# Patient Record
Sex: MALE | Race: WHITE | NOT HISPANIC OR LATINO | URBAN - METROPOLITAN AREA
[De-identification: names, ages, dates, MRNs, and addresses within clinical notes are randomized per-mention and may not be internally consistent; named-entity substitution may affect disease eponyms.]

---

## 2017-06-17 ENCOUNTER — EMERGENCY (EMERGENCY)
Facility: HOSPITAL | Age: 47
LOS: 0 days | Discharge: HOME | End: 2017-06-17
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
## 2017-06-28 DIAGNOSIS — S86.912A STRAIN OF UNSPECIFIED MUSCLE(S) AND TENDON(S) AT LOWER LEG LEVEL, LEFT LEG, INITIAL ENCOUNTER: ICD-10-CM
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
## 2017-06-28 DIAGNOSIS — S89.92XA UNSPECIFIED INJURY OF LEFT LOWER LEG, INITIAL ENCOUNTER: ICD-10-CM
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
## 2017-06-28 DIAGNOSIS — Z98.890 OTHER SPECIFIED POSTPROCEDURAL STATES: ICD-10-CM

## 2017-09-28 ENCOUNTER — OUTPATIENT (OUTPATIENT)
Dept: OUTPATIENT SERVICES | Facility: HOSPITAL | Age: 47
LOS: 1 days | Discharge: HOME | End: 2017-09-28

## 2017-09-28 DIAGNOSIS — Z01.818 ENCOUNTER FOR OTHER PREPROCEDURAL EXAMINATION: ICD-10-CM

## 2017-10-12 ENCOUNTER — OUTPATIENT (OUTPATIENT)
Dept: OUTPATIENT SERVICES | Facility: HOSPITAL | Age: 47
LOS: 1 days | Discharge: HOME | End: 2017-10-12

## 2017-10-17 DIAGNOSIS — M23.232 DERANGEMENT OF OTHER MEDIAL MENISCUS DUE TO OLD TEAR OR INJURY, LEFT KNEE: ICD-10-CM

## 2017-10-17 DIAGNOSIS — M94.262 CHONDROMALACIA, LEFT KNEE: ICD-10-CM

## 2018-01-17 ENCOUNTER — OUTPATIENT (OUTPATIENT)
Dept: OUTPATIENT SERVICES | Facility: HOSPITAL | Age: 48
LOS: 1 days | Discharge: HOME | End: 2018-01-17

## 2018-01-17 DIAGNOSIS — Z01.818 ENCOUNTER FOR OTHER PREPROCEDURAL EXAMINATION: ICD-10-CM

## 2018-01-25 ENCOUNTER — OUTPATIENT (OUTPATIENT)
Dept: OUTPATIENT SERVICES | Facility: HOSPITAL | Age: 48
LOS: 1 days | Discharge: HOME | End: 2018-01-25

## 2018-01-29 DIAGNOSIS — M94.212 CHONDROMALACIA, LEFT SHOULDER: ICD-10-CM

## 2018-01-29 DIAGNOSIS — M75.42 IMPINGEMENT SYNDROME OF LEFT SHOULDER: ICD-10-CM

## 2018-01-29 DIAGNOSIS — M75.122 COMPLETE ROTATOR CUFF TEAR OR RUPTURE OF LEFT SHOULDER, NOT SPECIFIED AS TRAUMATIC: ICD-10-CM

## 2018-06-11 PROBLEM — Z00.00 ENCOUNTER FOR PREVENTIVE HEALTH EXAMINATION: Status: ACTIVE | Noted: 2018-06-11

## 2018-07-05 ENCOUNTER — APPOINTMENT (OUTPATIENT)
Dept: PLASTIC SURGERY | Facility: CLINIC | Age: 48
End: 2018-07-05
Payer: MEDICAID

## 2018-07-05 VITALS — BODY MASS INDEX: 30.06 KG/M2 | HEIGHT: 70 IN | WEIGHT: 210 LBS

## 2018-07-05 DIAGNOSIS — Z78.9 OTHER SPECIFIED HEALTH STATUS: ICD-10-CM

## 2018-07-05 DIAGNOSIS — L72.9 FOLLICULAR CYST OF THE SKIN AND SUBCUTANEOUS TISSUE, UNSPECIFIED: ICD-10-CM

## 2018-07-05 PROCEDURE — 99203 OFFICE O/P NEW LOW 30 MIN: CPT

## 2022-07-20 ENCOUNTER — LABORATORY RESULT (OUTPATIENT)
Age: 52
End: 2022-07-20

## 2022-07-20 ENCOUNTER — APPOINTMENT (OUTPATIENT)
Dept: RHEUMATOLOGY | Facility: CLINIC | Age: 52
End: 2022-07-20

## 2022-07-20 ENCOUNTER — TRANSCRIPTION ENCOUNTER (OUTPATIENT)
Age: 52
End: 2022-07-20

## 2022-07-20 VITALS
WEIGHT: 210 LBS | HEIGHT: 70 IN | DIASTOLIC BLOOD PRESSURE: 84 MMHG | BODY MASS INDEX: 30.06 KG/M2 | HEART RATE: 92 BPM | SYSTOLIC BLOOD PRESSURE: 144 MMHG | OXYGEN SATURATION: 98 %

## 2022-07-20 LAB
ALBUMIN SERPL ELPH-MCNC: 4.5 G/DL
ALP BLD-CCNC: 38 U/L
ALT SERPL-CCNC: 39 U/L
ANION GAP SERPL CALC-SCNC: 13 MMOL/L
AST SERPL-CCNC: 33 U/L
BASOPHILS # BLD AUTO: 0.03 K/UL
BASOPHILS NFR BLD AUTO: 0.5 %
BILIRUB SERPL-MCNC: 0.5 MG/DL
BUN SERPL-MCNC: 10 MG/DL
CALCIUM SERPL-MCNC: 9.4 MG/DL
CHLORIDE SERPL-SCNC: 103 MMOL/L
CK SERPL-CCNC: 738 U/L
CO2 SERPL-SCNC: 25 MMOL/L
CREAT SERPL-MCNC: 1.4 MG/DL
EGFR: 60 ML/MIN/1.73M2
EOSINOPHIL # BLD AUTO: 0.04 K/UL
EOSINOPHIL NFR BLD AUTO: 0.7 %
ERYTHROCYTE [SEDIMENTATION RATE] IN BLOOD BY WESTERGREN METHOD: 5 MM/HR
GLUCOSE SERPL-MCNC: 61 MG/DL
HCT VFR BLD CALC: 49.3 %
HGB BLD-MCNC: 16.8 G/DL
IMM GRANULOCYTES NFR BLD AUTO: 0.5 %
LYMPHOCYTES # BLD AUTO: 1.39 K/UL
LYMPHOCYTES NFR BLD AUTO: 22.8 %
MAN DIFF?: NORMAL
MCHC RBC-ENTMCNC: 32.1 PG
MCHC RBC-ENTMCNC: 34.1 G/DL
MCV RBC AUTO: 94.1 FL
MONOCYTES # BLD AUTO: 0.64 K/UL
MONOCYTES NFR BLD AUTO: 10.5 %
NEUTROPHILS # BLD AUTO: 3.96 K/UL
NEUTROPHILS NFR BLD AUTO: 65 %
PLATELET # BLD AUTO: 181 K/UL
POTASSIUM SERPL-SCNC: 3.9 MMOL/L
PROT SERPL-MCNC: 6.6 G/DL
RBC # BLD: 5.24 M/UL
RBC # FLD: 12.3 %
SODIUM SERPL-SCNC: 141 MMOL/L
WBC # FLD AUTO: 6.09 K/UL

## 2022-07-20 PROCEDURE — 99205 OFFICE O/P NEW HI 60 MIN: CPT

## 2022-07-20 NOTE — ASSESSMENT
[FreeTextEntry1] : Arthralgias in setting of psoriasis\par -His history suggests inflammatory arthritis of joint and possible sacroiliitis with morning stiffness of 1 hour and constantly having to move his joints to improve his symptoms. He has no joint swelling or synovitis on exam, and his lumbar flexion is normal. He also has intermittent episodes of red, itchy, dry and sometimes painful eyes. Its unclear if all of his symptoms are related to underlying psoriasis. Will check x-rays hands, wrists, elbows, knees, sacroiliac joint. Check RF, CCP, ESR, CRP, HLA B27, hepatitis B and C serologies, TB quantiferon, SSA, SSB. Start diclofenac 75 mg BID PRN. Follow up in 2 weeks to go over results

## 2022-07-20 NOTE — REASON FOR VISIT
[Initial Evaluation] : an initial evaluation [Spouse] : spouse [FreeTextEntry1] : joint pain in setting of psoriasis

## 2022-07-20 NOTE — HISTORY OF PRESENT ILLNESS
[FreeTextEntry1] : He reports being diagnosed with psoriasis 1 month ago involving his fingernails and cuticles. He follows with [facility]. He was treated with injections in his cuticles and fingernails, and clobetasol cream. Both treatments have helped. His left thumb subungual biopsy suggested hyperkeratosis. He reports joint pains for the last 1 year involving his fingers, wrists, elbows, and knees associated with 30 minutes - 1 hour of AM stiffness helped with movement, how shower, and Advil. Symptoms worsen when he stays still. Reports swelling of his fingers as well. He reports low back pain for over 1 year with 1+ hour of AM  stiffness, better when he moves around and walks, and advil, worse at rest and staying still. Reports heel pain. He has episodes of eye redness, dry eyes, itchiness that can become painful. He uses artificial tears that helps somewhat. Reports abdominal pains for the past few months having bowel movements 1x/daily used to have up to 4-5x/daily. Reports mouth ulcers, dry mouth, fatigue, night sweats. Denies dactylitis, hematochezia.

## 2022-07-20 NOTE — PHYSICAL EXAM
[General Appearance - Alert] : alert [General Appearance - In No Acute Distress] : in no acute distress [Sclera] : the sclera and conjunctiva were normal [PERRL With Normal Accommodation] : pupils were equal in size, round, and reactive to light [Extraocular Movements] : extraocular movements were intact [Outer Ear] : the ears and nose were normal in appearance [Oropharynx] : the oropharynx was normal [Neck Appearance] : the appearance of the neck was normal [Neck Cervical Mass (___cm)] : no neck mass was observed [Jugular Venous Distention Increased] : there was no jugular-venous distention [Thyroid Diffuse Enlargement] : the thyroid was not enlarged [Thyroid Nodule] : there were no palpable thyroid nodules [Auscultation Breath Sounds / Voice Sounds] : lungs were clear to auscultation bilaterally [Heart Rate And Rhythm] : heart rate was normal and rhythm regular [Heart Sounds] : normal S1 and S2 [Heart Sounds Gallop] : no gallops [Murmurs] : no murmurs [Heart Sounds Pericardial Friction Rub] : no pericardial rub [Bowel Sounds] : normal bowel sounds [Abdomen Soft] : soft [Abdomen Tenderness] : non-tender [] : no hepato-splenomegaly [Abdomen Mass (___ Cm)] : no abdominal mass palpated [Abnormal Walk] : normal gait [Nail Clubbing] : no clubbing  or cyanosis of the fingernails [Musculoskeletal - Swelling] : no joint swelling seen [Motor Tone] : muscle strength and tone were normal [Affect] : the affect was normal [Mood] : the mood was normal [FreeTextEntry1] : Dry skin bilateral knees. Finger nails with hyperpigmentation subungual, areas of onychomycosis

## 2022-07-21 LAB
25(OH)D3 SERPL-MCNC: 37 NG/ML
CRP SERPL-MCNC: <3 MG/L
HBV CORE IGG+IGM SER QL: NONREACTIVE
HBV CORE IGM SER QL: NONREACTIVE
HBV SURFACE AB SER QL: NONREACTIVE
HBV SURFACE AG SER QL: NONREACTIVE
HCV AB SER QL: NONREACTIVE
HCV S/CO RATIO: 0.06 S/CO
RHEUMATOID FACT SER QL: <10 IU/ML

## 2022-07-22 LAB
CCP AB SER IA-ACNC: <8 UNITS
ENA SS-A AB SER IA-ACNC: <0.2 AL
ENA SS-B AB SER IA-ACNC: <0.2 AL
RF+CCP IGG SER-IMP: NEGATIVE

## 2022-07-25 LAB
ALDOLASE SERPL-CCNC: 10 U/L
M TB IFN-G BLD-IMP: NEGATIVE
QUANTIFERON TB PLUS MITOGEN MINUS NIL: 6.07 IU/ML
QUANTIFERON TB PLUS NIL: 0.02 IU/ML
QUANTIFERON TB PLUS TB1 MINUS NIL: -0.01 IU/ML
QUANTIFERON TB PLUS TB2 MINUS NIL: -0.01 IU/ML

## 2022-07-29 LAB — HLA-B27 RELATED AG QL: NEGATIVE

## 2022-07-30 ENCOUNTER — RESULT REVIEW (OUTPATIENT)
Age: 52
End: 2022-07-30

## 2022-07-30 ENCOUNTER — OUTPATIENT (OUTPATIENT)
Dept: OUTPATIENT SERVICES | Facility: HOSPITAL | Age: 52
LOS: 1 days | Discharge: HOME | End: 2022-07-30

## 2022-07-30 DIAGNOSIS — M25.50 PAIN IN UNSPECIFIED JOINT: ICD-10-CM

## 2022-07-30 PROCEDURE — 73110 X-RAY EXAM OF WRIST: CPT | Mod: 26,50

## 2022-07-30 PROCEDURE — 73080 X-RAY EXAM OF ELBOW: CPT | Mod: 26,50

## 2022-07-30 PROCEDURE — 73562 X-RAY EXAM OF KNEE 3: CPT | Mod: 26,50

## 2022-07-30 PROCEDURE — 73630 X-RAY EXAM OF FOOT: CPT | Mod: 26,50

## 2022-07-30 PROCEDURE — 72202 X-RAY EXAM SI JOINTS 3/> VWS: CPT | Mod: 26

## 2022-07-30 PROCEDURE — 73130 X-RAY EXAM OF HAND: CPT | Mod: 26,50

## 2022-08-08 ENCOUNTER — EMERGENCY (EMERGENCY)
Facility: HOSPITAL | Age: 52
LOS: 0 days | Discharge: HOME | End: 2022-08-09
Attending: EMERGENCY MEDICINE | Admitting: EMERGENCY MEDICINE

## 2022-08-08 VITALS
HEART RATE: 96 BPM | RESPIRATION RATE: 18 BRPM | WEIGHT: 210.1 LBS | DIASTOLIC BLOOD PRESSURE: 91 MMHG | TEMPERATURE: 96 F | SYSTOLIC BLOOD PRESSURE: 143 MMHG | OXYGEN SATURATION: 99 %

## 2022-08-08 DIAGNOSIS — R20.2 PARESTHESIA OF SKIN: ICD-10-CM

## 2022-08-08 DIAGNOSIS — M54.50 LOW BACK PAIN, UNSPECIFIED: ICD-10-CM

## 2022-08-08 PROCEDURE — 99284 EMERGENCY DEPT VISIT MOD MDM: CPT

## 2022-08-08 RX ORDER — KETOROLAC TROMETHAMINE 30 MG/ML
15 SYRINGE (ML) INJECTION ONCE
Refills: 0 | Status: DISCONTINUED | OUTPATIENT
Start: 2022-08-08 | End: 2022-08-08

## 2022-08-08 RX ORDER — METHOCARBAMOL 500 MG/1
1500 TABLET, FILM COATED ORAL ONCE
Refills: 0 | Status: COMPLETED | OUTPATIENT
Start: 2022-08-08 | End: 2022-08-08

## 2022-08-08 RX ADMIN — Medication 15 MILLIGRAM(S): at 23:40

## 2022-08-08 RX ADMIN — METHOCARBAMOL 1500 MILLIGRAM(S): 500 TABLET, FILM COATED ORAL at 23:40

## 2022-08-08 NOTE — ED PROVIDER NOTE - PATIENT PORTAL LINK FT
You can access the FollowMyHealth Patient Portal offered by [facility] by registering at the following website: [email or web address]. By joining [employer]’s FollowMyHealth portal, you will also be able to view your health information using other applications (apps) compatible with our system.

## 2022-08-08 NOTE — ED PROVIDER NOTE - OBJECTIVE STATEMENT
52yM prior back pain (last year, same location but milder sx and resolved w/ ED treatment) p/w severe low back pain x 2d.  Reports back pain that is R paraspinal and associated w/ hard knot that he can feel just off of midline.  No recollected trauma or heavy lifting.  Says pain shoots to R buttock and now has tingling in his RLE.  Is preferentially bending over in the ED to relieve some of the discomfort.  He is able to stand and move and walk w/ steady gait, but is uncomfortable standing up straight.  No saddle anesthesia or urinary/fecal retention or incontinence.  No fevers.

## 2022-08-08 NOTE — ED PROVIDER NOTE - PHYSICAL EXAMINATION
CONSTITUTIONAL: well developed; well nourished; uncomfortable appearing  HEAD: normocephalic; atraumatic  EYES: no conjunctival injection, no scleral icterus  ENT: no nasal discharge; airway clear.  NECK: supple; non tender. + full passive ROM in all directions  CARD: warm and well perfused, not tachycardic  RESP: breathing comfortably on RA, speaking in full sentences w/o distress  BACK: no midline t/l/s spinal tenderness, no CVA tenderness  EXT: moving all extremities spontaneously, normal ROM. No clubbing, cyanosis or edema  SKIN: warm and dry, no lesions noted  NEURO: alert, oriented, CN II-XII grossly intact, motor and sensory grossly intact, speech nonslurred, stable gait, no focal deficits. GCS 15  PSYCH: calm, cooperative, appropriate, good eye contact, logical thought process, no apparent danger to self or others

## 2022-08-08 NOTE — ED PROVIDER NOTE - CLINICAL SUMMARY MEDICAL DECISION MAKING FREE TEXT BOX
52yM p/w flare of his prior low back pain, likely sciatica.  Pt well appearing w/o red flags to necessitate imaging (beyond age - but pt w/ known herniated discs from prior imaging).  No focal neuro deficits.  UA w/o UTI.  Pt improved with toradol and robaxin.  Recommend supportive care, o/p PCP f/u, return precautions.

## 2022-08-08 NOTE — ED PROVIDER NOTE - NS ED ROS FT
Constitutional: no fevers/chills, no sick contacts  Eyes: No visual changes, eye pain or discharge. No photophobia  ENMT: No hearing changes, pain, discharge or infections. No sore throat or drooling.  Neck:  No neck pain or stiffness. No limited ROM  Cardiac: No SOB or edema. No chest pain with exertion.  Respiratory: No cough or respiratory distress. No hemoptysis. No history of asthma or RAD  GI: No nausea, vomiting, diarrhea or abdominal pain  : No dysuria, frequency or burning. No discharge  MS: No myalgia, muscle weakness, joint pain, +back pain radiating down his R leg  Neuro: No headache or weakness. No LOC  Skin: No skin rash  Endo: no diabetes or thyroid dysfunction  Heme: no abnormal bleeding or clotting  Except as documented in the HPI, all other systems are negative

## 2022-08-09 LAB
APPEARANCE UR: CLEAR — SIGNIFICANT CHANGE UP
BACTERIA # UR AUTO: ABNORMAL
BILIRUB UR-MCNC: NEGATIVE — SIGNIFICANT CHANGE UP
COLOR SPEC: YELLOW — SIGNIFICANT CHANGE UP
COMMENT - URINE: SIGNIFICANT CHANGE UP
DIFF PNL FLD: NEGATIVE — SIGNIFICANT CHANGE UP
EPI CELLS # UR: ABNORMAL /HPF
GLUCOSE UR QL: NEGATIVE MG/DL — SIGNIFICANT CHANGE UP
KETONES UR-MCNC: NEGATIVE — SIGNIFICANT CHANGE UP
LEUKOCYTE ESTERASE UR-ACNC: NEGATIVE — SIGNIFICANT CHANGE UP
NITRITE UR-MCNC: NEGATIVE — SIGNIFICANT CHANGE UP
PH UR: 6 — SIGNIFICANT CHANGE UP (ref 5–8)
PROT UR-MCNC: 30 MG/DL
RBC CASTS # UR COMP ASSIST: NEGATIVE — SIGNIFICANT CHANGE UP
SP GR SPEC: 1.02 — SIGNIFICANT CHANGE UP (ref 1.01–1.03)
UROBILINOGEN FLD QL: 0.2 MG/DL — SIGNIFICANT CHANGE UP
WBC UR QL: SIGNIFICANT CHANGE UP /HPF

## 2022-08-09 RX ORDER — METHOCARBAMOL 500 MG/1
2 TABLET, FILM COATED ORAL
Qty: 30 | Refills: 0
Start: 2022-08-09

## 2022-08-17 ENCOUNTER — APPOINTMENT (OUTPATIENT)
Dept: ORTHOPEDIC SURGERY | Facility: CLINIC | Age: 52
End: 2022-08-17

## 2022-08-17 ENCOUNTER — RX RENEWAL (OUTPATIENT)
Age: 52
End: 2022-08-17

## 2022-08-17 DIAGNOSIS — D17.20 BENIGN LIPOMATOUS NEOPLASM OF SKIN AND SUBCUTANEOUS TISSUE OF UNSPECIFIED LIMB: ICD-10-CM

## 2022-08-17 PROCEDURE — 99203 OFFICE O/P NEW LOW 30 MIN: CPT

## 2022-08-17 NOTE — HISTORY OF PRESENT ILLNESS
[de-identified] : Patient is here for evaluation of the right forearm elbow area upper arm area which he had a mass had an MRI that shows a 5 x 6 cm mass and is here for evaluation I treated him for his knees and shoulders in the past\par \par  right elbow has good range of motion with his noticeable prominence along the lateral aspect going into the arm is no erythema good hand range of motion\par \par  MRI report also with mass going into the  brachialis\par \par  recommend seeing orthopedic oncologist given 5 choices he understands all the issues will follow-up with us as needed

## 2022-08-31 ENCOUNTER — APPOINTMENT (OUTPATIENT)
Dept: RHEUMATOLOGY | Facility: CLINIC | Age: 52
End: 2022-08-31

## 2022-08-31 VITALS
HEART RATE: 89 BPM | WEIGHT: 210 LBS | TEMPERATURE: 98.1 F | BODY MASS INDEX: 30.06 KG/M2 | SYSTOLIC BLOOD PRESSURE: 128 MMHG | HEIGHT: 70 IN | DIASTOLIC BLOOD PRESSURE: 84 MMHG | OXYGEN SATURATION: 97 %

## 2022-08-31 PROCEDURE — 99214 OFFICE O/P EST MOD 30 MIN: CPT

## 2022-09-01 NOTE — ASSESSMENT
[FreeTextEntry1] : Arthralgias in setting of psoriasis\par -His history suggests inflammatory arthritis of joint and possible sacroiliitis with morning stiffness of 1 hour and constantly having to move his joints to improve his symptoms. He has no joint swelling or synovitis on exam, and his lumbar flexion is normal. Its unclear if all of his symptoms are related to underlying psoriasis\par -Hand x-ray's showed OA, and showed an erosion in right 3rd middle phalanx concerning for underlying inflammatory arthropathy ? from psoriasis. Otherwise elbow, SI joint, foot, knee x-rays suggested OA. \par -Switch to meloxicam 15 mg daily PRN\par -Discussed treatment options and will start trial of MTX 6 tabs weekly with FA 1 mg daily. F/u in 6 weeks check monitoring labs CBC, CMP, ESR, CRP 1 month

## 2022-09-15 ENCOUNTER — RX RENEWAL (OUTPATIENT)
Age: 52
End: 2022-09-15

## 2022-09-30 ENCOUNTER — RX RENEWAL (OUTPATIENT)
Age: 52
End: 2022-09-30

## 2022-10-12 ENCOUNTER — APPOINTMENT (OUTPATIENT)
Dept: RHEUMATOLOGY | Facility: CLINIC | Age: 52
End: 2022-10-12

## 2022-10-12 PROCEDURE — 99214 OFFICE O/P EST MOD 30 MIN: CPT

## 2022-10-12 RX ORDER — FOLIC ACID 1 MG/1
1 TABLET ORAL
Qty: 30 | Refills: 2 | Status: DISCONTINUED | COMMUNITY
Start: 2022-08-31 | End: 2022-10-12

## 2022-10-12 RX ORDER — METHOTREXATE 2.5 MG/1
2.5 TABLET ORAL
Qty: 24 | Refills: 2 | Status: DISCONTINUED | COMMUNITY
Start: 2022-08-31 | End: 2022-10-12

## 2022-10-12 RX ORDER — DICLOFENAC SODIUM 75 MG/1
75 TABLET, DELAYED RELEASE ORAL
Qty: 60 | Refills: 0 | Status: DISCONTINUED | COMMUNITY
Start: 2022-07-20 | End: 2022-10-12

## 2022-10-12 NOTE — ASSESSMENT
[FreeTextEntry1] : Arthralgias in setting of psoriasis possible psoriatic arthritis\par -His history suggests inflammatory arthritis of joint and possible sacroiliitis with morning stiffness of 1 hour and constantly having to move his joints to improve his symptoms. He has no joint swelling or synovitis on exam, and his lumbar flexion is normal. \par -Hand x-ray's showed OA, and showed an erosion in right 3rd middle phalanx concerning for underlying inflammatory arthropathy ? from psoriasis. Otherwise elbow, SI joint, foot, knee x-rays suggested degenerative changed. \par -his joint pains are both from degenerative arthritis and inflammatory component from psoriasis\par -Continue meloxicam 15 mg daily PRN\par -Didn't want to start MTX due to side effects\par -Discussed Oteza r/b/a he agrees. \par -F/u 1 month

## 2022-10-12 NOTE — HISTORY OF PRESENT ILLNESS
[FreeTextEntry1] : 10/12/22:\par he is s/p lipoma resection right elbow/forearm at [facility] awaiting pathology results. He was off meloxicam prior to surgery and noticed worsening joint pains in his hands, wrists, and hips. He didn't start MTX he doesn't want to take medication to lower the immune systemic. Feels in fingers and hands are swollen, he has a hard time making a fist in the morning and fingers lock, and they're stiff for at least 1 hour in the mornings. They're painful with use during the days. Denies dactylitis or worsening psoriasis. \par \par \par 8/31/22:\par he reports takin diclofenac 75 mg 6 tabs/day that helps his joint pains. He gets a lot of stiffness in his hands and trouble opening and closing his hands as well. Fingers feel swollen. \par \par Initial visit:\par He reports being diagnosed with psoriasis 1 month ago involving his fingernails and cuticles. He follows with [facility]. He was treated with injections in his cuticles and fingernails, and clobetasol cream. Both treatments have helped. His left thumb subungual biopsy suggested hyperkeratosis. He reports joint pains for the last 1 year involving his fingers, wrists, elbows, and knees associated with 30 minutes - 1 hour of AM stiffness helped with movement, how shower, and Advil. Symptoms worsen when he stays still. Reports swelling of his fingers as well. He reports low back pain for over 1 year with 1+ hour of AM  stiffness, better when he moves around and walks, and advil, worse at rest and staying still. Reports heel pain. He has episodes of eye redness, dry eyes, itchiness that can become painful. He uses artificial tears that helps somewhat. Reports abdominal pains for the past few months having bowel movements 1x/daily used to have up to 4-5x/daily. Reports mouth ulcers, dry mouth, fatigue, night sweats. Denies dactylitis, hematochezia.

## 2022-10-12 NOTE — PHYSICAL EXAM
[General Appearance - Alert] : alert [General Appearance - In No Acute Distress] : in no acute distress [Sclera] : the sclera and conjunctiva were normal [PERRL With Normal Accommodation] : pupils were equal in size, round, and reactive to light [Extraocular Movements] : extraocular movements were intact [Outer Ear] : the ears and nose were normal in appearance [Oropharynx] : the oropharynx was normal [Neck Appearance] : the appearance of the neck was normal [Neck Cervical Mass (___cm)] : no neck mass was observed [Jugular Venous Distention Increased] : there was no jugular-venous distention [Thyroid Diffuse Enlargement] : the thyroid was not enlarged [Thyroid Nodule] : there were no palpable thyroid nodules [Auscultation Breath Sounds / Voice Sounds] : lungs were clear to auscultation bilaterally [Heart Rate And Rhythm] : heart rate was normal and rhythm regular [Heart Sounds] : normal S1 and S2 [Heart Sounds Gallop] : no gallops [Murmurs] : no murmurs [Heart Sounds Pericardial Friction Rub] : no pericardial rub [Bowel Sounds] : normal bowel sounds [Abdomen Soft] : soft [Abdomen Tenderness] : non-tender [] : no hepato-splenomegaly [Abdomen Mass (___ Cm)] : no abdominal mass palpated [Abnormal Walk] : normal gait [Nail Clubbing] : no clubbing  or cyanosis of the fingernails [Musculoskeletal - Swelling] : no joint swelling seen [Motor Tone] : muscle strength and tone were normal [FreeTextEntry1] : Dry skin bilateral knees. Finger nails with hyperpigmentation subungual, areas of onychomycosis  [Affect] : the affect was normal [Mood] : the mood was normal

## 2022-11-28 ENCOUNTER — RX RENEWAL (OUTPATIENT)
Age: 52
End: 2022-11-28

## 2022-12-06 LAB
ALBUMIN SERPL ELPH-MCNC: 4.7 G/DL
ALP BLD-CCNC: 52 U/L
ALT SERPL-CCNC: 81 U/L
ANION GAP SERPL CALC-SCNC: 14 MMOL/L
AST SERPL-CCNC: 38 U/L
BASOPHILS # BLD AUTO: 0.05 K/UL
BASOPHILS NFR BLD AUTO: 0.8 %
BILIRUB SERPL-MCNC: 0.5 MG/DL
BUN SERPL-MCNC: 16 MG/DL
CALCIUM SERPL-MCNC: 10 MG/DL
CHLORIDE SERPL-SCNC: 101 MMOL/L
CO2 SERPL-SCNC: 27 MMOL/L
CREAT SERPL-MCNC: 1.4 MG/DL
CRP SERPL-MCNC: <3 MG/L
EGFR: 60 ML/MIN/1.73M2
EOSINOPHIL # BLD AUTO: 0.1 K/UL
EOSINOPHIL NFR BLD AUTO: 1.6 %
ERYTHROCYTE [SEDIMENTATION RATE] IN BLOOD BY WESTERGREN METHOD: 3 MM/HR
GLUCOSE SERPL-MCNC: 76 MG/DL
HCT VFR BLD CALC: 50.8 %
HGB BLD-MCNC: 16.3 G/DL
IMM GRANULOCYTES NFR BLD AUTO: 0.5 %
LYMPHOCYTES # BLD AUTO: 1.46 K/UL
LYMPHOCYTES NFR BLD AUTO: 24.1 %
MAN DIFF?: NORMAL
MCHC RBC-ENTMCNC: 30.8 PG
MCHC RBC-ENTMCNC: 32.1 G/DL
MCV RBC AUTO: 96 FL
MONOCYTES # BLD AUTO: 0.57 K/UL
MONOCYTES NFR BLD AUTO: 9.4 %
NEUTROPHILS # BLD AUTO: 3.86 K/UL
NEUTROPHILS NFR BLD AUTO: 63.6 %
PLATELET # BLD AUTO: 194 K/UL
POTASSIUM SERPL-SCNC: 4.6 MMOL/L
PROT SERPL-MCNC: 7.5 G/DL
RBC # BLD: 5.29 M/UL
RBC # FLD: 13.2 %
SODIUM SERPL-SCNC: 142 MMOL/L
WBC # FLD AUTO: 6.07 K/UL

## 2022-12-07 ENCOUNTER — APPOINTMENT (OUTPATIENT)
Dept: RHEUMATOLOGY | Facility: CLINIC | Age: 52
End: 2022-12-07

## 2022-12-07 PROCEDURE — 99213 OFFICE O/P EST LOW 20 MIN: CPT

## 2022-12-07 NOTE — ASSESSMENT
[FreeTextEntry1] : Arthralgias in setting of psoriasis possible psoriatic arthritis\par -His history suggests inflammatory arthritis of joint and possible sacroiliitis with morning stiffness of 1 hour and constantly having to move his joints to improve his symptoms. He has no joint swelling or synovitis on exam, and his lumbar flexion is normal. \par -Hand x-ray's showed OA, and showed an erosion in right 3rd middle phalanx concerning for underlying inflammatory arthropathy ? from psoriasis. Otherwise elbow, SI joint, foot, knee x-rays suggested degenerative changed. \par -his joint pains are both from degenerative arthritis and possible inflammatory component from psoriasis\par -Continue meloxicam 15 mg PRN\par -Didn't want to start MTX due to side effects\par -Continue otezla 30 mg daily\par -He has elevated ALT 81 and normal AST. Otezla does not cause hepatotoxicity. Advised to hold meloxicam and repeat LFTs in 1 month\par -F/u 3 month

## 2022-12-07 NOTE — PHYSICAL EXAM
[General Appearance - Alert] : alert [General Appearance - In No Acute Distress] : in no acute distress [Sclera] : the sclera and conjunctiva were normal [PERRL With Normal Accommodation] : pupils were equal in size, round, and reactive to light [Extraocular Movements] : extraocular movements were intact [Outer Ear] : the ears and nose were normal in appearance [Oropharynx] : the oropharynx was normal [Neck Appearance] : the appearance of the neck was normal [Neck Cervical Mass (___cm)] : no neck mass was observed [Jugular Venous Distention Increased] : there was no jugular-venous distention [Thyroid Diffuse Enlargement] : the thyroid was not enlarged [Thyroid Nodule] : there were no palpable thyroid nodules [Auscultation Breath Sounds / Voice Sounds] : lungs were clear to auscultation bilaterally [Heart Rate And Rhythm] : heart rate was normal and rhythm regular [Heart Sounds] : normal S1 and S2 [Heart Sounds Gallop] : no gallops [Murmurs] : no murmurs [Heart Sounds Pericardial Friction Rub] : no pericardial rub [Bowel Sounds] : normal bowel sounds [Abdomen Soft] : soft [Abdomen Tenderness] : non-tender [] : no hepato-splenomegaly [Abdomen Mass (___ Cm)] : no abdominal mass palpated [Abnormal Walk] : normal gait [Nail Clubbing] : no clubbing  or cyanosis of the fingernails [Musculoskeletal - Swelling] : no joint swelling seen [Motor Tone] : muscle strength and tone were normal [Affect] : the affect was normal [Mood] : the mood was normal [FreeTextEntry1] : Dry skin bilateral knees. Finger nails improved, thumbs with hyperpigmentation that's resolving

## 2022-12-07 NOTE — HISTORY OF PRESENT ILLNESS
[FreeTextEntry1] : 12/7/22:\par He reports feeling 60% better since starting Otezla in his joints, and his nail psoriasis has improved. States 2 weeks ago it started to improve his pains. He still has cuticle redness. Says its easier to open and close his hands, and only takes meloxicam 1 or 2 days/week now. Denies significant AM stiffness. He says his fingers are becoming more swollen and rotating sideways. Denies enthesitis, uveitis, inflammatory bowel disease. +Chronic low back pain. \par \par 10/12/22:\par he is s/p lipoma resection right elbow/forearm at [facility] awaiting pathology results. He was off meloxicam prior to surgery and noticed worsening joint pains in his hands, wrists, and hips. He didn't start MTX he doesn't want to take medication to lower the immune systemic. Feels in fingers and hands are swollen, he has a hard time making a fist in the morning and fingers lock, and they're stiff for at least 1 hour in the mornings. They're painful with use during the days. Denies dactylitis or worsening psoriasis. \par \par \par 8/31/22:\par he reports takin diclofenac 75 mg 6 tabs/day that helps his joint pains. He gets a lot of stiffness in his hands and trouble opening and closing his hands as well. Fingers feel swollen. \par \par Initial visit:\par He reports being diagnosed with psoriasis 1 month ago involving his fingernails and cuticles. He [facility]. He was treated with injections in his cuticles and fingernails, and clobetasol cream. Both treatments have helped. His left thumb subungual biopsy suggested hyperkeratosis. He reports joint pains for the last 1 year involving his fingers, wrists, elbows, and knees associated with 30 minutes - 1 hour of AM stiffness helped with movement, how shower, and Advil. Symptoms worsen when he stays still. Reports swelling of his fingers as well. He reports low back pain for over 1 year with 1+ hour of AM  stiffness, better when he moves around and walks, and advil, worse at rest and staying still. Reports heel pain. He has episodes of eye redness, dry eyes, itchiness that can become painful. He uses artificial tears that helps somewhat. Reports abdominal pains for the past few months having bowel movements 1x/daily used to have up to 4-5x/daily. Reports mouth ulcers, dry mouth, fatigue, night sweats. Denies dactylitis, hematochezia.

## 2023-01-03 ENCOUNTER — RX RENEWAL (OUTPATIENT)
Age: 53
End: 2023-01-03

## 2023-02-02 ENCOUNTER — RX RENEWAL (OUTPATIENT)
Age: 53
End: 2023-02-02

## 2023-03-03 ENCOUNTER — RX RENEWAL (OUTPATIENT)
Age: 53
End: 2023-03-03

## 2023-04-17 ENCOUNTER — RX RENEWAL (OUTPATIENT)
Age: 53
End: 2023-04-17

## 2023-05-10 ENCOUNTER — APPOINTMENT (OUTPATIENT)
Dept: RHEUMATOLOGY | Facility: CLINIC | Age: 53
End: 2023-05-10
Payer: MEDICAID

## 2023-05-10 VITALS
WEIGHT: 210 LBS | OXYGEN SATURATION: 96 % | BODY MASS INDEX: 30.06 KG/M2 | HEART RATE: 93 BPM | SYSTOLIC BLOOD PRESSURE: 126 MMHG | DIASTOLIC BLOOD PRESSURE: 80 MMHG | HEIGHT: 70 IN

## 2023-05-10 DIAGNOSIS — M25.50 PAIN IN UNSPECIFIED JOINT: ICD-10-CM

## 2023-05-10 PROCEDURE — 99214 OFFICE O/P EST MOD 30 MIN: CPT

## 2023-05-10 RX ORDER — APREMILAST 10-20-30MG
KIT ORAL
Qty: 1 | Refills: 0 | Status: DISCONTINUED | COMMUNITY
Start: 2022-10-12 | End: 2023-05-10

## 2023-05-10 RX ORDER — OMEPRAZOLE 20 MG/1
20 CAPSULE, DELAYED RELEASE ORAL DAILY
Qty: 30 | Refills: 2 | Status: ACTIVE | COMMUNITY
Start: 2023-05-10 | End: 1900-01-01

## 2023-05-10 RX ORDER — MELOXICAM 15 MG/1
15 TABLET ORAL
Qty: 30 | Refills: 0 | Status: DISCONTINUED | COMMUNITY
Start: 2022-08-31 | End: 2023-05-10

## 2023-05-10 NOTE — PHYSICAL EXAM
[General Appearance - Alert] : alert [General Appearance - In No Acute Distress] : in no acute distress [Sclera] : the sclera and conjunctiva were normal [PERRL With Normal Accommodation] : pupils were equal in size, round, and reactive to light [Extraocular Movements] : extraocular movements were intact [Outer Ear] : the ears and nose were normal in appearance [Oropharynx] : the oropharynx was normal [Neck Appearance] : the appearance of the neck was normal [Neck Cervical Mass (___cm)] : no neck mass was observed [Jugular Venous Distention Increased] : there was no jugular-venous distention [Thyroid Diffuse Enlargement] : the thyroid was not enlarged [Thyroid Nodule] : there were no palpable thyroid nodules [Auscultation Breath Sounds / Voice Sounds] : lungs were clear to auscultation bilaterally [Heart Rate And Rhythm] : heart rate was normal and rhythm regular [Heart Sounds] : normal S1 and S2 [Heart Sounds Gallop] : no gallops [Murmurs] : no murmurs [Heart Sounds Pericardial Friction Rub] : no pericardial rub [Bowel Sounds] : normal bowel sounds [Abdomen Soft] : soft [Abdomen Tenderness] : non-tender [] : no hepato-splenomegaly [Abdomen Mass (___ Cm)] : no abdominal mass palpated [Abnormal Walk] : normal gait [Nail Clubbing] : no clubbing  or cyanosis of the fingernails [Musculoskeletal - Swelling] : no joint swelling seen [Motor Tone] : muscle strength and tone were normal [Affect] : the affect was normal [Mood] : the mood was normal [FreeTextEntry1] : Dry skin bilateral knees. Finger nails improved, thumbs with hyperpigmentation that's resolving. Periungual erythema around all finger nails

## 2023-05-10 NOTE — HISTORY OF PRESENT ILLNESS
[FreeTextEntry1] : 5/10/23:\par Reports pain in his joints, back, neck when he wakes up in the morning. He can't do a push up due to bilateral thumb and hand pain so he uses the bars to do push ups instead. He has stiffness in his joints for about 45 minutes in the morning and no swelling. Reports bilateral ankles are getting black and blue. +Increased heart burn possible from Otezla. Reports finger swelling. His finger nails "break like crackers" now. + increased dry eyes uses Systane and redness in the morning time. \par \par 12/7/22:\par He reports feeling 60% better since starting Otezla in his joints, and his nail psoriasis has improved. States 2 weeks ago it started to improve his pains. He still has cuticle redness. Says its easier to open and close his hands, and only takes meloxicam 1 or 2 days/week now. Denies significant AM stiffness. He says his fingers are becoming more swollen and rotating sideways. Denies enthesitis, uveitis, inflammatory bowel disease. +Chronic low back pain. \par \par 10/12/22:\par he is s/p lipoma resection right elbow/forearm at [facility] awaiting pathology results. He was off meloxicam prior to surgery and noticed worsening joint pains in his hands, wrists, and hips. He didn't start MTX he doesn't want to take medication to lower the immune systemic. Feels in fingers and hands are swollen, he has a hard time making a fist in the morning and fingers lock, and they're stiff for at least 1 hour in the mornings. They're painful with use during the days. Denies dactylitis or worsening psoriasis. \par \par \par 8/31/22:\par he reports takin diclofenac 75 mg 6 tabs/day that helps his joint pains. He gets a lot of stiffness in his hands and trouble opening and closing his hands as well. Fingers feel swollen. \par \par Initial visit:\par He reports being diagnosed with psoriasis 1 month ago involving his fingernails and cuticles. [facility]. He was treated with injections in his cuticles and fingernails, and clobetasol cream. Both treatments have helped. His left thumb subungual biopsy suggested hyperkeratosis. He reports joint pains for the last 1 year involving his fingers, wrists, elbows, and knees associated with 30 minutes - 1 hour of AM stiffness helped with movement, how shower, and Advil. Symptoms worsen when he stays still. Reports swelling of his fingers as well. He reports low back pain for over 1 year with 1+ hour of AM  stiffness, better when he moves around and walks, and advil, worse at rest and staying still. Reports heel pain. He has episodes of eye redness, dry eyes, itchiness that can become painful. He uses artificial tears that helps somewhat. Reports abdominal pains for the past few months having bowel movements 1x/daily used to have up to 4-5x/daily. Reports mouth ulcers, dry mouth, fatigue, night sweats. Denies dactylitis, hematochezia.

## 2023-05-10 NOTE — ASSESSMENT
[FreeTextEntry1] : Arthralgias in setting of psoriasis possible psoriatic arthritis\par -His history suggests inflammatory arthritis of joint and possible sacroiliitis with morning stiffness of 1 hour and constantly having to move his joints to improve his symptoms. He has no joint swelling or synovitis on exam, and his lumbar flexion is normal. \par -Hand x-ray's showed OA, and showed an erosion in right 3rd middle phalanx concerning for underlying inflammatory arthropathy ? from psoriasis. Otherwise elbow, SI joint, foot, knee x-rays suggested degenerative changed. \par -his joint pains are both from degenerative arthritis and possible inflammatory component from psoriasis\par -Meloxicam stopped due to elevated LFts that improved on repeat testing\par -Didn't want to start MTX due to side effects\par -Continue otezla 30 mg daily\par -Start cymbalta 30 mg BID r/b/a discussed he wants to hold off on immunosuppression\par -Check CBC, CMP\par -F/u 3 month

## 2023-06-14 ENCOUNTER — APPOINTMENT (OUTPATIENT)
Dept: ORTHOPEDIC SURGERY | Facility: CLINIC | Age: 53
End: 2023-06-14
Payer: MEDICAID

## 2023-06-14 DIAGNOSIS — M19.032 PRIMARY OSTEOARTHRITIS, LEFT WRIST: ICD-10-CM

## 2023-06-14 DIAGNOSIS — M19.031 PRIMARY OSTEOARTHRITIS, RIGHT WRIST: ICD-10-CM

## 2023-06-14 DIAGNOSIS — M18.0 BILATERAL PRIMARY OSTEOARTHRITIS OF FIRST CARPOMETACARPAL JOINTS: ICD-10-CM

## 2023-06-14 DIAGNOSIS — M67.431 GANGLION, RIGHT WRIST: ICD-10-CM

## 2023-06-14 PROCEDURE — 73110 X-RAY EXAM OF WRIST: CPT | Mod: 50

## 2023-06-14 PROCEDURE — 99215 OFFICE O/P EST HI 40 MIN: CPT

## 2023-06-14 PROCEDURE — 73130 X-RAY EXAM OF HAND: CPT | Mod: 50

## 2023-06-14 NOTE — ASSESSMENT
[FreeTextEntry1] : The patient comes in with bumps on both of his thumbs and wrist. He has pain in his hands when he does push ups. He can not lay his hand flat.  He also states his hands shake and he drops things. He has history of arthritis.  He has psoriasis which is being treated.  He is seeing a neurologist.  This is for the shaking of the hands.\par \par B/L hand: \par +thumb CMC swelling \par +thumb CMC tenderness, third CMC tenderness\par Decreased thumb ROM \par +Basal grind test\par \par Right wrist: There is a palpable soft tissue mass on the volar radial aspect of the wrist. There is no Tinel sign over the mass. Tima's test reveals patent radial and ulnar vessels. There is no proximal lymphadenopathy. The mass is soft and slightly mobile. 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 The natural history of the pathology was explained in full to the patient in layman's terms. We reviewed that the forces applied to the thumb tip are magnified 12-14 times at the thumb cmc joint.  We also reviewed the progression of arthritis with early arthritis showing minimal to no xray changes.  We discussed treatment options, including activity modification(demonstrated), medicine(topical and oral), bracing, therapy, injection and eventually surgery.  We reviewed the r/b of each.  All questions were answered and the patient verbalized understanding.\par The patient is going to wait until the pain is worse to move forward with any treatment. \par \par My impression is that the patient has a volar ganglion cyst. We discussed treatment options. The patient is going to wait until the pain is worse to move forward with any treatment. \par \par \par

## 2023-08-16 ENCOUNTER — APPOINTMENT (OUTPATIENT)
Dept: RHEUMATOLOGY | Facility: CLINIC | Age: 53
End: 2023-08-16

## 2023-09-06 ENCOUNTER — APPOINTMENT (OUTPATIENT)
Dept: ORTHOPEDIC SURGERY | Facility: CLINIC | Age: 53
End: 2023-09-06
Payer: MEDICAID

## 2023-09-06 DIAGNOSIS — S50.12XA CONTUSION OF LEFT FOREARM, INITIAL ENCOUNTER: ICD-10-CM

## 2023-09-06 PROCEDURE — 29130 APPL FINGER SPLINT STATIC: CPT

## 2023-09-06 PROCEDURE — 99214 OFFICE O/P EST MOD 30 MIN: CPT | Mod: 25

## 2023-09-07 PROBLEM — S50.12XA CONTUSION OF LEFT FOREARM, INITIAL ENCOUNTER: Status: ACTIVE | Noted: 2023-09-07

## 2023-09-15 ENCOUNTER — TRANSCRIPTION ENCOUNTER (OUTPATIENT)
Age: 53
End: 2023-09-15

## 2023-09-15 ENCOUNTER — APPOINTMENT (OUTPATIENT)
Dept: ORTHOPEDIC SURGERY | Facility: AMBULATORY SURGERY CENTER | Age: 53
End: 2023-09-15
Payer: MEDICAID

## 2023-09-15 PROCEDURE — 26432 REPAIR FINGER TENDON: CPT | Mod: F4

## 2023-09-26 ENCOUNTER — APPOINTMENT (OUTPATIENT)
Dept: ORTHOPEDIC SURGERY | Facility: CLINIC | Age: 53
End: 2023-09-26
Payer: MEDICAID

## 2023-09-26 PROCEDURE — 99024 POSTOP FOLLOW-UP VISIT: CPT

## 2023-09-26 PROCEDURE — 73140 X-RAY EXAM OF FINGER(S): CPT | Mod: LT

## 2023-10-08 ENCOUNTER — NON-APPOINTMENT (OUTPATIENT)
Age: 53
End: 2023-10-08

## 2023-10-25 ENCOUNTER — APPOINTMENT (OUTPATIENT)
Dept: ORTHOPEDIC SURGERY | Facility: CLINIC | Age: 53
End: 2023-10-25
Payer: MEDICAID

## 2023-10-25 PROCEDURE — 99024 POSTOP FOLLOW-UP VISIT: CPT

## 2023-10-27 ENCOUNTER — APPOINTMENT (OUTPATIENT)
Dept: ORTHOPEDIC SURGERY | Facility: CLINIC | Age: 53
End: 2023-10-27

## 2023-11-02 ENCOUNTER — NON-APPOINTMENT (OUTPATIENT)
Age: 53
End: 2023-11-02

## 2023-11-29 ENCOUNTER — APPOINTMENT (OUTPATIENT)
Dept: RHEUMATOLOGY | Facility: CLINIC | Age: 53
End: 2023-11-29
Payer: MEDICAID

## 2023-11-29 VITALS
HEIGHT: 70 IN | DIASTOLIC BLOOD PRESSURE: 82 MMHG | SYSTOLIC BLOOD PRESSURE: 157 MMHG | BODY MASS INDEX: 30.06 KG/M2 | OXYGEN SATURATION: 98 % | WEIGHT: 210 LBS | HEART RATE: 90 BPM

## 2023-11-29 DIAGNOSIS — M79.10 MYALGIA, UNSPECIFIED SITE: ICD-10-CM

## 2023-11-29 PROCEDURE — 99215 OFFICE O/P EST HI 40 MIN: CPT

## 2023-11-29 RX ORDER — DULOXETINE HYDROCHLORIDE 30 MG/1
30 CAPSULE, DELAYED RELEASE PELLETS ORAL
Qty: 60 | Refills: 2 | Status: DISCONTINUED | COMMUNITY
Start: 2023-05-10 | End: 2023-11-29

## 2023-12-06 ENCOUNTER — APPOINTMENT (OUTPATIENT)
Dept: ORTHOPEDIC SURGERY | Facility: CLINIC | Age: 53
End: 2023-12-06
Payer: MEDICAID

## 2023-12-06 DIAGNOSIS — M20.012 MALLET FINGER OF LEFT FINGER(S): ICD-10-CM

## 2023-12-06 PROCEDURE — 99024 POSTOP FOLLOW-UP VISIT: CPT

## 2024-01-31 ENCOUNTER — APPOINTMENT (OUTPATIENT)
Dept: RHEUMATOLOGY | Facility: CLINIC | Age: 54
End: 2024-01-31
Payer: MEDICAID

## 2024-01-31 VITALS
HEART RATE: 80 BPM | HEIGHT: 70 IN | DIASTOLIC BLOOD PRESSURE: 89 MMHG | WEIGHT: 210 LBS | OXYGEN SATURATION: 98 % | SYSTOLIC BLOOD PRESSURE: 156 MMHG | BODY MASS INDEX: 30.06 KG/M2

## 2024-01-31 DIAGNOSIS — L40.50 ARTHROPATHIC PSORIASIS, UNSPECIFIED: ICD-10-CM

## 2024-01-31 PROCEDURE — 99214 OFFICE O/P EST MOD 30 MIN: CPT

## 2024-03-06 ENCOUNTER — RX RENEWAL (OUTPATIENT)
Age: 54
End: 2024-03-06

## 2024-03-20 ENCOUNTER — NON-APPOINTMENT (OUTPATIENT)
Age: 54
End: 2024-03-20

## 2024-05-01 ENCOUNTER — APPOINTMENT (OUTPATIENT)
Dept: RHEUMATOLOGY | Facility: CLINIC | Age: 54
End: 2024-05-01
Payer: MEDICAID

## 2024-05-01 VITALS
DIASTOLIC BLOOD PRESSURE: 83 MMHG | HEART RATE: 92 BPM | OXYGEN SATURATION: 97 % | BODY MASS INDEX: 29.35 KG/M2 | HEIGHT: 70 IN | WEIGHT: 205 LBS | SYSTOLIC BLOOD PRESSURE: 143 MMHG

## 2024-05-01 PROCEDURE — 99214 OFFICE O/P EST MOD 30 MIN: CPT

## 2024-05-01 RX ORDER — CYCLOBENZAPRINE HYDROCHLORIDE 5 MG/1
5 TABLET, FILM COATED ORAL
Qty: 60 | Refills: 1 | Status: ACTIVE | COMMUNITY
Start: 2023-11-29 | End: 1900-01-01

## 2024-05-01 RX ORDER — MULTIVITAMIN
TABLET ORAL
Refills: 0 | Status: ACTIVE | COMMUNITY

## 2024-05-01 RX ORDER — MELOXICAM 15 MG/1
15 TABLET ORAL
Qty: 90 | Refills: 1 | Status: ACTIVE | COMMUNITY
Start: 2024-01-31 | End: 1900-01-01

## 2024-05-01 RX ORDER — APREMILAST 30 MG/1
30 TABLET, FILM COATED ORAL
Qty: 180 | Refills: 1 | Status: ACTIVE | COMMUNITY
Start: 2023-10-03 | End: 1900-01-01

## 2024-05-01 NOTE — HISTORY OF PRESENT ILLNESS
[FreeTextEntry1] : After his last appointment, in February 2024 pt developed fevers a/w severe  whole body pain. However, since that time his pain has been improving, and he is generally feeling okay currently, aside from hand pain. He does take cyclobenzaprine every night because he can't sleep otherwise. He stopped taking the sulfasalazine because it was causing migraines, he is back to taking just the Otezla now. He has not taken the meloxicam in a few weeks.  Previous HPI: Pt has had years of constant pain and stiffness in his hands, with finger swelling and trouble making fists, knee pain and hip pain, as well as pain in his hamstrings and thighs, and episodes of total body pain with difficulty sleeping. In 2022, he also developed psoriasis. He saw dermatology, then saw Dr. Santillan. It was unclear whether his joint symptoms are due to psoriatic arthritis. He was started on Otezla, initially with improvement in his skin and joint symptoms but now pt is not sure if the effect of it may be wearing off. Also + nail changes which were thought to be nail psoriasis? He sees Dr. Self of dermatology, was prescribed a topical treatment for his nails with improvement.   Pt takes prn meloxicam with improvement of his symptoms. At his last visit with Dr. Santillan in May 2023, he was also prescribed duloxetine, but he did not take it because he was concerned about possible mood side effects.  + Eye photosensitivity. + Frequent headaches - migraines? + 1 episode of hematochezia recently. Pt has not had a colonoscopy. He denies diarrhea.  Prior medications: - Methotrexate - discontinued due to elevated LFTs - Currently taking Otezla, used to help more but now pt is not sure - Previously prescribed duloxetine but pt was concerned about taking it due to mood side effects - Meloxicam prn helps - Cyclobenzaprine helps - Sulfasalazine - discontinued due to migraines  Physical exam: GEN: Pleasant, AAO man sitting on exam table in NAD SKIN:  Interval improvement in minimal areas of flaking and erythema on b/l extensor surfaces of knees and elbows PULM: Clear to auscultation b/l CV: Regular rate and rhythm, no murmurs MSK: Shoulders: Full ROM b/l, + pain with ROM b/l Elbows: Full ROM b/l, no effusions Wrists: Full ROM b/l, no effusions Hands: +? fullness in MCPs, no TTP Hips: Full ROM b/l Knees: no effusions, full ROM b/l Ankles: no effusions, full ROM b/l Feet: no effusions, no TTP EXT: no psoriasis-specific nail changes noted

## 2024-05-01 NOTE — ASSESSMENT
[FreeTextEntry1] : Psoriasis with joint pain: At this point it remains unclear whether pt's joint symptoms are secondary to psoriatic arthritis. He has no clear e/o inflammatory arthritis on his exam, and he has no specific symptoms or exam findings to suggest psoriatic arthritis. Some of his symptoms are also suggestive of possible fibromyalgia. He did have hand x-rays with a possible erosion in the middle phalanx of the right middle finger, but no other findings on imaging to suggest an inflammatory arthritis process. 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 His pain has improved significantly over the past 3 months for unclear reasons - weather?  - Continue Otezla 30 mg BID - Continue prn meloxicam - Continue prn cyclobenzaprine 5-10 mg qhs prn pain  f/u in  6 months

## 2024-08-19 ENCOUNTER — APPOINTMENT (OUTPATIENT)
Dept: ORTHOPEDIC SURGERY | Facility: CLINIC | Age: 54
End: 2024-08-19
Payer: MEDICAID

## 2024-08-19 DIAGNOSIS — S46.211A STRAIN OF MUSCLE, FASCIA AND TENDON OF OTHER PARTS OF BICEPS, RIGHT ARM, INITIAL ENCOUNTER: ICD-10-CM

## 2024-08-19 PROCEDURE — 73030 X-RAY EXAM OF SHOULDER: CPT | Mod: RT

## 2024-08-19 PROCEDURE — 99213 OFFICE O/P EST LOW 20 MIN: CPT

## 2024-08-19 NOTE — HISTORY OF PRESENT ILLNESS
[de-identified] : Patient is here for evaluation of his right shoulder is felt a pop while he was bowling on the 17th has ecchymosis pain in the mid arm he does compete for bodybuilding  Physical exam right shoulder significant ecchymosis over the mid arm and elbow area with swelling along his biceps good range of motion some pain with biceps resistance  X-rays show some irregularity along the rotator cuff footprint but no soft tissue calcifications no glenohumeral joint arthritis  Diagnosis is rule out proximal biceps tear right shoulder  Recommend a stat MRI and some Valium so we can get the MRI done since he does have some anxiety and claustrophobia we will see him back in 2 weeks

## 2024-08-24 ENCOUNTER — APPOINTMENT (OUTPATIENT)
Dept: MRI IMAGING | Facility: CLINIC | Age: 54
End: 2024-08-24

## 2024-08-24 PROCEDURE — 73221 MRI JOINT UPR EXTREM W/O DYE: CPT | Mod: RT

## 2024-08-28 ENCOUNTER — APPOINTMENT (OUTPATIENT)
Dept: ORTHOPEDIC SURGERY | Facility: CLINIC | Age: 54
End: 2024-08-28

## 2024-08-28 DIAGNOSIS — S46.909A UNSPECIFIED INJURY OF UNSPECIFIED MUSCLE, FASCIA AND TENDON AT SHOULDER AND UPPER ARM LEVEL, UNSPECIFIED ARM, INITIAL ENCOUNTER: ICD-10-CM

## 2024-08-28 PROCEDURE — 99213 OFFICE O/P EST LOW 20 MIN: CPT

## 2024-08-28 NOTE — HISTORY OF PRESENT ILLNESS
[de-identified] : Patient is here after telephone call earlier this morning and I asked him to come into the swelling down and see better definition of his muscle MRI came back with cuff tear  On physical exam he has a bulge on the medial aspect of the proximal arm distal to the axillary area just above the upper at the middle third of the arm medially, he has ecchymosis from the proximal arm down to his wrist medially elbow has full range of motion  This injury occurred about 11 days ago while bowling felt a pop in the mid to distal arm and now he has ecchymosis as well as noticeable deformity proximal to the mid arm consistent with a tear of the coracoid brachialis muscle  Recommend an MRI of the arm to rule out a coracoid brachialis muscle tear from its insertion on the mid to distal third of the arm and now is retracted towards its origin on the coracoid he is claustrophobic and wants to go to the same facility that had an open MRI of note he is a competitive  and has well-defined muscles in his upper extremities

## 2024-08-29 ENCOUNTER — APPOINTMENT (OUTPATIENT)
Dept: MRI IMAGING | Facility: CLINIC | Age: 54
End: 2024-08-29

## 2024-08-29 PROCEDURE — 73218 MRI UPPER EXTREMITY W/O DYE: CPT | Mod: RT

## 2024-08-29 RX ORDER — DIAZEPAM 5 MG/1
5 TABLET ORAL
Qty: 1 | Refills: 0 | Status: ACTIVE | COMMUNITY
Start: 2024-08-19 | End: 1900-01-01

## 2024-09-04 ENCOUNTER — APPOINTMENT (OUTPATIENT)
Dept: RHEUMATOLOGY | Facility: CLINIC | Age: 54
End: 2024-09-04
Payer: MEDICAID

## 2024-09-04 VITALS
BODY MASS INDEX: 30.78 KG/M2 | OXYGEN SATURATION: 97 % | WEIGHT: 215 LBS | HEIGHT: 70 IN | HEART RATE: 99 BPM | TEMPERATURE: 98.3 F | DIASTOLIC BLOOD PRESSURE: 93 MMHG | SYSTOLIC BLOOD PRESSURE: 152 MMHG

## 2024-09-04 PROCEDURE — 99214 OFFICE O/P EST MOD 30 MIN: CPT

## 2024-09-04 NOTE — HISTORY OF PRESENT ILLNESS
[FreeTextEntry1] : Pt has been feeling better recently, still has pain intermittently, e.g. today in his knee, but overall the symptoms are manageable. + Intermittent flares of psoriasis in his face, worse with stress. + Intermittent cramping in the LEs, worse at night. Pt takes cyclobenzaprine sometimes at night with improvement of pain, also takes meloxicam rarely. Pt sustained an injury to the R bicep recently while bowling, had an MRI of his shoulder with ortho which demonstrated a hematoma and partial muscle tear. He is currently waiting to meet with ortho to discuss whether surgery is indicated.  Previous HPI: Pt has had years of constant pain and stiffness in his hands, with finger swelling and trouble making fists, knee pain and hip pain, as well as pain in his hamstrings and thighs, and episodes of total body pain with difficulty sleeping. In 2022, he also developed psoriasis. He saw dermatology, then saw Dr. Santillan. It was unclear whether his joint symptoms are due to psoriatic arthritis. He was started on Otezla, initially with improvement in his skin and joint symptoms but now pt is not sure if the effect of it may be wearing off. Also + nail changes which were thought to be nail psoriasis? He sees Dr. Self of dermatology, was prescribed a topical treatment for his nails with improvement.   Pt takes prn meloxicam with improvement of his symptoms. At his last visit with Dr. Santillan in May 2023, he was also prescribed duloxetine, but he did not take it because he was concerned about possible mood side effects.  + Eye photosensitivity. + Frequent headaches - migraines? + 1 episode of hematochezia recently. Pt has not had a colonoscopy. He denies diarrhea.  Prior medications: - Methotrexate - discontinued due to elevated LFTs - Currently taking Otezla, used to help more but now pt is not sure - Previously prescribed duloxetine but pt was concerned about taking it due to mood side effects - Meloxicam prn helps - Cyclobenzaprine helps - Sulfasalazine - discontinued due to migraines  Physical exam: GEN: Pleasant, AAO man sitting on exam table in NAD SKIN:  + Mildly erythematous plaque on extensor surface of L knee PULM: Clear to auscultation b/l CV: Regular rate and rhythm, no murmurs MSK: Shoulders: Full ROM b/l, + pain with ROM b/l Elbows: Full ROM b/l, no effusions Wrists: Full ROM b/l, no effusions Hands: No synovitis Hips: Full ROM b/l Knees: no effusions, full ROM b/l Ankles: no effusions, full ROM b/l Feet: no effusions, no TTP EXT: no psoriasis-specific nail changes noted

## 2024-09-04 NOTE — ASSESSMENT
[FreeTextEntry1] : Psoriasis with joint pain: At this point it remains unclear whether pt's joint symptoms are secondary to psoriatic arthritis. He has no clear e/o inflammatory arthritis on his exam, and he has no specific symptoms or exam findings to suggest psoriatic arthritis. Some of his symptoms are also suggestive of possible fibromyalgia. He did have hand x-rays with a possible erosion in the middle phalanx of the right middle finger, but no other findings on imaging to suggest an inflammatory arthritis process. 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   - Continue Otezla 30 mg BID - Continue prn meloxicam - Continue prn cyclobenzaprine 5-10 mg qhs prn pain  f/u in 6 months

## 2024-09-06 ENCOUNTER — NON-APPOINTMENT (OUTPATIENT)
Age: 54
End: 2024-09-06

## 2024-09-18 ENCOUNTER — APPOINTMENT (OUTPATIENT)
Dept: ORTHOPEDIC SURGERY | Facility: CLINIC | Age: 54
End: 2024-09-18
Payer: MEDICAID

## 2024-09-18 DIAGNOSIS — T14.8XXA OTHER INJURY OF UNSPECIFIED BODY REGION, INITIAL ENCOUNTER: ICD-10-CM

## 2024-09-18 PROCEDURE — 99213 OFFICE O/P EST LOW 20 MIN: CPT

## 2024-09-18 NOTE — HISTORY OF PRESENT ILLNESS
[de-identified] : Patient's MRI came back with a hematoma and has resolved significantly about two thirds so far and it should take either 1 or 2 more months for it to completely resolve no follow-up as needed

## 2024-12-09 ENCOUNTER — RX RENEWAL (OUTPATIENT)
Age: 54
End: 2024-12-09

## 2025-02-19 ENCOUNTER — RX RENEWAL (OUTPATIENT)
Age: 55
End: 2025-02-19

## 2025-03-10 ENCOUNTER — RX RENEWAL (OUTPATIENT)
Age: 55
End: 2025-03-10

## 2025-05-12 ENCOUNTER — RX RENEWAL (OUTPATIENT)
Age: 55
End: 2025-05-12

## 2025-08-04 ENCOUNTER — RX RENEWAL (OUTPATIENT)
Age: 55
End: 2025-08-04

## 2025-08-06 ENCOUNTER — APPOINTMENT (OUTPATIENT)
Dept: RHEUMATOLOGY | Facility: CLINIC | Age: 55
End: 2025-08-06
Payer: MEDICAID

## 2025-08-06 VITALS
WEIGHT: 210 LBS | BODY MASS INDEX: 30.06 KG/M2 | HEIGHT: 70 IN | DIASTOLIC BLOOD PRESSURE: 93 MMHG | OXYGEN SATURATION: 96 % | SYSTOLIC BLOOD PRESSURE: 146 MMHG | HEART RATE: 94 BPM

## 2025-08-06 DIAGNOSIS — Z00.00 ENCOUNTER FOR GENERAL ADULT MEDICAL EXAMINATION W/OUT ABNORMAL FINDINGS: ICD-10-CM

## 2025-08-06 DIAGNOSIS — K21.9 GASTRO-ESOPHAGEAL REFLUX DISEASE W/OUT ESOPHAGITIS: ICD-10-CM

## 2025-08-06 DIAGNOSIS — L40.50 ARTHROPATHIC PSORIASIS, UNSPECIFIED: ICD-10-CM

## 2025-08-06 PROCEDURE — 99214 OFFICE O/P EST MOD 30 MIN: CPT

## 2025-08-06 RX ORDER — CYCLOBENZAPRINE HYDROCHLORIDE 5 MG/1
5 TABLET, FILM COATED ORAL
Qty: 180 | Refills: 1 | Status: ACTIVE | COMMUNITY
Start: 2025-08-06 | End: 1900-01-01

## 2025-08-06 RX ORDER — OMEPRAZOLE 20 MG/1
20 CAPSULE, DELAYED RELEASE ORAL DAILY
Qty: 90 | Refills: 3 | Status: ACTIVE | COMMUNITY
Start: 2025-08-06 | End: 1900-01-01

## 2025-08-07 RX ORDER — APREMILAST 30 MG/1
30 TABLET, FILM COATED ORAL
Qty: 180 | Refills: 3 | Status: ACTIVE | COMMUNITY
Start: 2025-08-06